# Patient Record
Sex: FEMALE | Race: WHITE | NOT HISPANIC OR LATINO | Employment: OTHER | URBAN - METROPOLITAN AREA
[De-identification: names, ages, dates, MRNs, and addresses within clinical notes are randomized per-mention and may not be internally consistent; named-entity substitution may affect disease eponyms.]

---

## 2019-01-05 ENCOUNTER — OFFICE VISIT (OUTPATIENT)
Dept: URGENT CARE | Facility: MEDICAL CENTER | Age: 84
End: 2019-01-05
Payer: MEDICARE

## 2019-01-05 VITALS
OXYGEN SATURATION: 97 % | BODY MASS INDEX: 35.08 KG/M2 | HEART RATE: 80 BPM | WEIGHT: 185.8 LBS | SYSTOLIC BLOOD PRESSURE: 140 MMHG | DIASTOLIC BLOOD PRESSURE: 76 MMHG | HEIGHT: 61 IN | TEMPERATURE: 97.5 F

## 2019-01-05 DIAGNOSIS — K11.20 PAROTITIS: ICD-10-CM

## 2019-01-05 PROCEDURE — 99203 OFFICE O/P NEW LOW 30 MIN: CPT | Performed by: PHYSICIAN ASSISTANT

## 2019-01-05 RX ORDER — LEVOTHYROXINE SODIUM 0.05 MG/1
50 TABLET ORAL
Refills: 3 | COMMUNITY
Start: 2018-11-30

## 2019-01-05 RX ORDER — AMOXICILLIN AND CLAVULANATE POTASSIUM 875; 125 MG/1; MG/1
1 TABLET, FILM COATED ORAL 2 TIMES DAILY
Qty: 20 TAB | Refills: 0 | Status: SHIPPED | OUTPATIENT
Start: 2019-01-05 | End: 2019-01-15

## 2019-01-05 RX ORDER — LOSARTAN POTASSIUM 50 MG/1
50 TABLET ORAL
Refills: 2 | COMMUNITY
Start: 2018-11-29

## 2019-01-05 ASSESSMENT — ENCOUNTER SYMPTOMS
SORE THROAT: 0
COUGH: 0
NECK PAIN: 0
RHINORRHEA: 0
CHILLS: 0
FEVER: 0
HEADACHES: 0

## 2019-01-06 NOTE — PROGRESS NOTES
"Subjective:   Jeaneth Tapia is a 83 y.o. female who presents for Otalgia (rt ear Xlast night)        Otalgia    There is pain in the left ear. This is a new problem. The current episode started yesterday. The problem occurs constantly. The problem has been gradually worsening. There has been no fever. The pain is moderate. Pertinent negatives include no coughing, ear discharge, headaches, hearing loss, neck pain, rhinorrhea or sore throat. She has tried nothing for the symptoms. There is no history of a chronic ear infection, hearing loss or a tympanostomy tube.     Review of Systems   Constitutional: Negative for chills and fever.   HENT: Positive for ear pain. Negative for congestion, ear discharge, hearing loss, rhinorrhea, sore throat and tinnitus.    Respiratory: Negative for cough.    Musculoskeletal: Negative for neck pain.   Neurological: Negative for headaches.       PMH:  has no past medical history on file. No hx of ear infections.  MEDS:   Current Outpatient Prescriptions:   •  levothyroxine (SYNTHROID) 50 MCG Tab, Take 50 mcg by mouth every day., Disp: , Rfl: 3  •  losartan (COZAAR) 50 MG Tab, Take 50 mg by mouth every day., Disp: , Rfl: 2  •  amoxicillin-clavulanate (AUGMENTIN) 875-125 MG Tab, Take 1 Tab by mouth 2 times a day for 10 days., Disp: 20 Tab, Rfl: 0  ALLERGIES:   Allergies   Allergen Reactions   • Compazine      No control of facial muscles   • Demerol      vommitting     SURGHX: No past surgical history on file.  SOCHX:  reports that she has never smoked. She has never used smokeless tobacco. She reports that she does not drink alcohol or use drugs.  FH: Family history was reviewed, no pertinent findings to report   Objective:   /76   Pulse 80   Temp 36.4 °C (97.5 °F) (Temporal)   Ht 1.549 m (5' 1\")   Wt 84.3 kg (185 lb 12.8 oz)   SpO2 97%   BMI 35.11 kg/m²   Physical Exam   Constitutional: She appears well-developed and well-nourished.   HENT:   Head: Normocephalic and " atraumatic.   Right Ear: Tympanic membrane, external ear and ear canal normal.   Left Ear: Tympanic membrane, external ear and ear canal normal.   Nose: Nose normal.   Mouth/Throat: Uvula is midline, oropharynx is clear and moist and mucous membranes are normal.   Pt is wearing dentures  Partial cerumen obstruction of left ear  Right parotid gland is mildly edematous and moderately tender to palpation.   Neck: Neck supple.   Pulmonary/Chest: Effort normal. No respiratory distress.   Lymphadenopathy:        Head (right side): Preauricular adenopathy present. No submental and no submandibular adenopathy present.        Head (left side): No submental, no submandibular and no preauricular adenopathy present.     She has cervical adenopathy.        Right cervical: Superficial cervical adenopathy present.        Left cervical: No superficial cervical adenopathy present.   Neurological: She is alert.   Skin: Skin is warm and dry.   Psychiatric: She has a normal mood and affect. Her behavior is normal. Thought content normal.   Vitals reviewed.        Assessment/Plan:   1. Parotitis    - amoxicillin-clavulanate (AUGMENTIN) 875-125 MG Tab; Take 1 Tab by mouth 2 times a day for 10 days.  Dispense: 20 Tab; Refill: 0    PE consistent with parotitis.  Pt is well appearing, afebrile, and VSS. I will begin pt on Augmentin.  Pt denies hx of kidney injury or decreased kidney function.    If pt's symptoms fail to improve in the next 2 days or worsen, she was instructed to RTC for reevaluation.    Differential diagnosis, natural history, supportive care, and indications for immediate follow-up discussed.

## 2021-01-13 DIAGNOSIS — Z23 NEED FOR VACCINATION: ICD-10-CM
